# Patient Record
Sex: FEMALE | Race: WHITE
[De-identification: names, ages, dates, MRNs, and addresses within clinical notes are randomized per-mention and may not be internally consistent; named-entity substitution may affect disease eponyms.]

---

## 2017-08-06 ENCOUNTER — HOSPITAL ENCOUNTER (EMERGENCY)
Dept: HOSPITAL 25 - UCEAST | Age: 82
Discharge: HOME | End: 2017-08-06
Payer: SELF-PAY

## 2017-08-06 VITALS — DIASTOLIC BLOOD PRESSURE: 60 MMHG | SYSTOLIC BLOOD PRESSURE: 150 MMHG
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
## 2017-08-06 DIAGNOSIS — M25.571: Primary | ICD-10-CM
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
## 2017-08-06 DIAGNOSIS — I10: ICD-10-CM

## 2017-08-06 PROCEDURE — 99212 OFFICE O/P EST SF 10 MIN: CPT

## 2017-08-06 PROCEDURE — G0463 HOSPITAL OUTPT CLINIC VISIT: HCPCS
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
## 2018-01-28 ENCOUNTER — HOSPITAL ENCOUNTER (EMERGENCY)
Dept: HOSPITAL 25 - UCEAST | Age: 83
Discharge: HOME | End: 2018-01-28
Payer: MEDICARE

## 2018-01-28 VITALS — DIASTOLIC BLOOD PRESSURE: 49 MMHG | SYSTOLIC BLOOD PRESSURE: 118 MMHG

## 2018-01-28 DIAGNOSIS — I10: ICD-10-CM
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
## 2018-01-28 DIAGNOSIS — E05.90: ICD-10-CM
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
## 2018-01-28 DIAGNOSIS — S72.091A: Primary | ICD-10-CM

## 2018-01-28 DIAGNOSIS — M81.0: ICD-10-CM

## 2018-01-28 DIAGNOSIS — K21.9: ICD-10-CM

## 2018-01-28 DIAGNOSIS — E78.5: ICD-10-CM

## 2018-01-28 PROCEDURE — 99213 OFFICE O/P EST LOW 20 MIN: CPT

## 2018-01-28 PROCEDURE — G0463 HOSPITAL OUTPT CLINIC VISIT: HCPCS

## 2018-01-28 NOTE — RAD
INDICATION: Traumatic fracture right elbow     



COMPARISON: None



TECHNIQUE: AP, lateral, and oblique views were obtained.



FINDINGS: There is a distracted and rotated fracture from the olecranon. No other

definitive fractures are evident. There is underlying osteoarthritic. Change. There is

prominent soft tissue swelling about the proximal ulnar fracture site.



IMPRESSION: PROXIMAL ULNAR FRACTURE

## 2018-01-28 NOTE — RAD
INDICATION: Right hip pain     



COMPARISON: Lumbar spine 2008

 

TECHNIQUE: An AP view of the pelvis and AP views of the hip in neutral and abducted

position were obtained 



FINDINGS: 



Bones: There are no acute bony findings. 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

hemipelvis, unchanged.



Joint spaces: There is moderate osteoarthritis about the right hip.



SI joints/symphysis: The SI joints and symphysis are intact.



Other: None



IMPRESSION: OSTEOARTHRITIS RIGHT HIP. 

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
## 2018-01-28 NOTE — RAD
INDICATION: Traumatic fracture right shoulder. Hypercalcemia     



COMPARISON: None



TECHNIQUE: AP, lateral, and oblique views were obtained.



FINDINGS: There is osteopenia with an acute fracture surgical neck of the humerus and

humeral head. The fracture fragments are displaced rotated laterally. There are multiple

old right-sided rib fractures. There are possible areas of focal lytic change in the

clavicle or perhaps the scapula. There are some limitations due to osteopenia and

position. Consider follow-up imaging to include CT imaging imaging when the patient's

clinical condition permits.



IMPRESSION: ACUTE PROXIMAL HUMERAL FRACTURE. FOLLOW-UP IMAGING MAY BE REQUIRED (SEE

ABOVE).

## 2018-08-25 ENCOUNTER — HOSPITAL ENCOUNTER (EMERGENCY)
Dept: HOSPITAL 25 - UCEAST | Age: 83
Discharge: HOME | End: 2018-08-25
Payer: MEDICARE

## 2018-08-25 VITALS — SYSTOLIC BLOOD PRESSURE: 150 MMHG | DIASTOLIC BLOOD PRESSURE: 60 MMHG
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
## 2018-08-25 DIAGNOSIS — I10: ICD-10-CM

## 2018-08-25 DIAGNOSIS — E05.90: ICD-10-CM
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
## 2018-08-25 DIAGNOSIS — S40.011A: Primary | ICD-10-CM

## 2018-08-25 PROCEDURE — G0463 HOSPITAL OUTPT CLINIC VISIT: HCPCS

## 2018-08-25 PROCEDURE — 99211 OFF/OP EST MAY X REQ PHY/QHP: CPT

## 2018-08-25 NOTE — ED
Upper Extremity Pain





- HPI Summary


HPI Summary: 








IN-ROOM NOTE: 





Patient is a 83 y/o F w/ c/o RIGHT SHOULDER DISCOMFORT onsetting after a FALL 

WHILE GETTING UP FROM A CHAIR four days ago. Patient directly struck shoulder 

on uncarpeted floor. No spinal injury, head injury, LOC. Shoulder little 

initial pain after incident. Two days ago, pain worsened. There was BRUISING on 

LOWER BACK. Pain in room is still present. According to daughter, while 

cleaning frying pan with circular arm motion, she experiencing right shoulder 

pain which radiated down to back. Daughter was concerned she broke her right 

shoulder. Patient states she feels fine except for inability to raise right 

arm. Daughter states patient states she experiences sharp pain while raising 

right shoulder. When she broke right shoulder and elbow this past January, she 

had surgery at that point. Right shoulder has been broken previously. Patient 

lives with daughter. FMHx of HTN, no cardiac disease on her side. Patient is 

retired. PMHx of afib is denied. Patient takes medication for hypertension and 

bone issues. 





MD NOTE:


This is an 84-year-old female who has osteoporosis complaining of right 

shoulder discomfort after a fall 4 days ago.  She was sitting at a table and 

when she went to get up she fell between 2 chairs.  She denies other injury, 

loss of consciousness, neck pain or acute confusion.





Her vital signs are stable, she is afebrile.  Physical history reveals a 

fracture to the right shoulder in January, 2018.





Her previous medical history is significant for hypertension and 

hyperthyroidism.  She also has vitamin D deficiency.  She is brought to the 

[facility] by her daughter.





She complains of moderate pain over the anterior lateral aspect of the right 

shoulder, and her systolic pressure is 150.





NURSE'S NOTE:


Pt c/o pain in R shoulder.  Pt states fell from dining room table between two 

chairs on Tuesday 8/21/18.  Denies hitting head- denies LOC.  Pt states pain is 

same from Tuesday. Pain is 6/10 on note.  





- History of Current Complaint


Chief Complaint: UCUpperExtremity


Stated Complaint: R SHOULDER INJURY


Time Seen by Provider: 08/25/18 07:52


Hx Obtained From: Patient, Family/Caretaker - daughter contributes to HPI


Hx Last Menstrual Period: N/A


Mechanism Of Injury: Other - patient was getting up from a chair, fell and hit 

her right shoulder on uncarpeted floor


Onset/Duration: Started Days Ago - injury four days ago, Still Present, Worse 

Since - two days ago


Timing: Constant


Severity Currently: Moderate - 6/10


Character: Sharp


Aggravating Factor(s): Other - circular motions while cleaning pan aggravate 

pain


Alleviating Factor(s): Nothing


Associated Signs & Symptoms: Positive: Bruising - LOWER BACK, Other - NEGATIVE: 

HEAD INJURY, LOC, SPINAL INJURY





- Allergies/Home Medications


Allergies/Adverse Reactions: 


 Allergies











Allergy/AdvReac Type Severity Reaction Status Date / Time


 


No Known Allergies Allergy   Verified 08/25/18 07:40














PMH/Surg Hx/FS Hx/Imm Hx


Previously Healthy: Yes


Endocrine/Hematology History: Reports: Hx Thyroid Disease - HYPERTHYROIDISM


   Denies: Hx Diabetes


Cardiovascular History: Reports: Hx Hypertension


   Denies: Hx Congestive Heart Failure, Hx Deep Vein Thrombosis, Hx Myocardial 

Infarction, Hx Pacemaker/ICD


Respiratory History: 


   Denies: Hx Asthma, Hx Chronic Obstructive Pulmonary Disease (COPD), Hx Lung 

Cancer, Hx Pneumonia, Hx Pulmonary Embolism


GI History: 


   Denies: Hx Gall Bladder Disease, Hx Gastrointestinal Bleed, Hx Ulcer, Hx 

Urosepsis


   Comment Only: Other GI Disorders - REFLUX


 History: 


   Denies: Hx Kidney Stones, Hx Renal Disease


Neurological History: 


   Denies: Hx Dementia, Hx Migraine, Hx Seizures, Hx Transient Ischemic Attacks 

(TIA)


Psychiatric History: Reports: Hx Depression - Dysthymia.


   Denies: Hx Anxiety, Hx Schizophrenia, Hx Bipolar Disorder





- Surgical History


Surgery Procedure, Year, and Place: R shoulder and elbow Jan 2018.  pelvic bone


Infectious Disease History: No


Infectious Disease History: Reports: Hx Shingles


   Denies: Hx Clostridium Difficile, Hx Hepatitis, Hx Human Immunodeficiency 

Virus (HIV), Hx of Known/Suspected MRSA, Hx Tuberculosis, Hx Known/Suspected VRE

, Hx Known/Suspected VRSA, History Other Infectious Disease, Traveled Outside 

the [place] in Last 30 Days





- Family History


Known Family History: Positive: Hypertension, Other - Osteoporosis, father had 

strokes


   Negative: None





- Social History


Alcohol Use: None


Substance Use Type: Reports: None


Smoking Status (MU): Never Smoked Tobacco





Review of Systems


Positive: Other - POSITIVE: RIGHT SHOULDER PAIN, LOWER BACK PAIN; NEGATIVE: 

SPINAL INJURY 


Positive: Bruising - LOWER BACK 


Neurological: Other - NEGATIVE: HEAD INJURY 


Negative: Syncope


All Other Systems Reviewed And Are Negative: Yes





- Comments


Additional Review of Systems Comments: 


POSITIVE: RIGHT SHOULDER PAIN, LOWER BACK PAIN, LOWER BACK BRUISING. 


NEGATIVE: LOC, HEAD INJURY, SPINAL INJURY








Physical Exam





- Summary


Physical Exam Summary: 


Appearance: The patient is well-appearing, is in no pain distress, and is well-

nourished.


Eyes: Conjunctiva are clear.


ENT: The hearing is grossly normal, the pharynx is normal, and the TMs are 

normal. There is no muffled or hoarse voice.


Neck: The neck is supple and there is no lymphadenopathy.


Respiratory: The chest is nontender. LUNGS CLEAR, there are normal breath sounds

, and there is no respiratory distress.


Cardiovascular: HEART REGULAR RATE AND RHYTHM. There is no murmur.


Abdomen: The abdomen is soft and nontender. There is no organomegaly.


Bowel sounds: present


Musculoskeletal: Strength is intact. The patient moves all extremities.


Neurological: The patient is alert.


Psychological: The patient displays age appropriate behavior


Skin: Negative for rashes. 





EXAMINATION OF RIGHT SHOULDER: PATIENT IS SITTING COMFORTABLY, CAN REMOVE SHIRT 

WITHOUT DISTRESS. NO PAIN WITH PASSIVE MOVEMENT, FOWARD ACROSS CHEST, MID 

DISCOMFORT WITH TWISTING AND PUTTING ARM BEHIND BACK; NO CREPITUS OVER 

POSTERIOR THORAX; MILD TENDERNESS OVER ANTERIOR SHOULDER JUST MEDIAL TO THE 

HUMERAL HEAD. NO EVIDENT FRACTURE OR DISLOCATION. GOOD MOVEMENT AND CIRCULATION 

OVER ELBOW, WRIST, AND HAND ON RIGHT.





Triage Information Reviewed: Yes


Vital Signs On Initial Exam: 


 Initial Vitals











Temp Pulse Resp BP Pulse Ox


 


 97.6 F   67   18   150/60   99 


 


 08/25/18 07:43  08/25/18 07:43  08/25/18 07:43  08/25/18 07:43  08/25/18 07:43











Vital Signs Reviewed: Yes





Diagnostics





- Vital Signs


 Vital Signs











  Temp Pulse Resp BP Pulse Ox


 


 08/25/18 07:43  97.6 F  67  18  150/60  99














- Laboratory


Lab Statement: Any lab studies that have been ordered have been reviewed, and 

results considered in the medical decision making process.





- Radiology


  ** RIGHT SHOULDER X-RAY


Xray Interpretation: No Acute Changes


Radiology Interpretation Completed By: Radiologist - REPORT AND IMPRESSION:  #.

   Healed impacted surgical neck fracture of the humerus.  #.  No acute 

fracture evident. Normal acromioclavicular and glenohumeral joint alignment.  #

.  Significant glenohumeral joint osteophytosis without significant joint space 

narrowing.  #.  Unremarkable soft tissue contours.  #.  Chronic healed 

fractures at multiple RIGHT ribs noted. RIGHT supraclavicular fossa level 

surgical clips. [facility] physician has reviewed this report and agrees.





Re-Evaluation





- Re-Evaluation


  ** First Eval


Re-Evaluation Time: 08:52


Comment: Discussed x-ray results and informed patient of plan to discharge. 

Patient is agreeable with plan.





Course/Dx





- Course


Course Of Treatment: 83 y/o w/ osteoporosis who reinjuried her right shoulder. 

Previous hx of comminuted fracture. Exam is unremarkable and consistent with X-

ray reading that there is no acute fracture. The differential is fracture vs 

contusion, Dx is contusion of right shoulder and soft tissue injury.





- Diagnoses


Differential Diagnosis/HQI/PQRI: Positive: Other - right shoulder fracture vs 

right shoulder contusion


Provider Diagnoses: 


 Contusion








Discharge





- Sign-Out/Discharge


Documenting (check all that apply): Patient Departure - discharge 


All imaging exams completed and their final reports reviewed: Yes





- Discharge Plan


Condition: Stable


Disposition: HOME


Patient Education Materials:  Contusion in Adults (ED)


Referrals: 


Theodore Sawyer MD [Primary Care Provider] - 


Additional Instructions: 


PLEASE SEEK CARE AT THE EMERGENCY DEPARTMENT IF SYMPTOMS WORSEN OR IF NEW 

SYMPTOMS DEVELOP. FOLLOW UP WITH YOUR PRIMARY CARE PHYSICIAN.





AS we discussed:





You have bruised her right shoulder.  There is no evidence of broken bones.





Warm moist heat to the area in the morning; ice during the day for any sharp 

pains.





Keep the shoulder loose by gentle swinging exercises.





Follow-up or call us at any time for increased pain or disability.

















- Billing Disposition and Condition


Condition: STABLE


Disposition: Home





- Attestation Statements


Document Initiated by Bao: Yes


Documenting Scribe: Rodrigue Bojorquez


Provider For Whom Bao is Documenting (Include Credential): Casey Flores M.D. 


Scribe Attestation: 


Rodrigue RAHMAN scribed for Casey Flores M.D.  on 08/25/18 at 0900. 


Scribe Documentation Reviewed: Yes


Provider Attestation: 


The documentation as recorded by the Rodrigue barraza accurately reflects 

the service I personally performed and the decisions made by me, Casey Flores M.D.

## 2018-08-25 NOTE — RAD
Indication: RIGHT shoulder/scapular region pain on abduction and post fall. Previous

surgery in February 2018.



Comparison: January 28, 2018



Technique: Internal rotation AP, external rotation Grashey, scapular Y, axillary views

RIGHT shoulder



REPORT AND IMPRESSION: 

#.   Healed impacted surgical neck fracture of the humerus. 

#.  No acute fracture evident. Normal acromioclavicular and glenohumeral joint alignment. 

#.  Significant glenohumeral joint osteophytosis without significant joint space

narrowing. 

#.  Unremarkable soft tissue contours. 

#.  Chronic healed fractures at multiple RIGHT ribs noted. RIGHT supraclavicular fossa

level surgical clips.

## 2019-07-17 ENCOUNTER — HOSPITAL ENCOUNTER (EMERGENCY)
Dept: HOSPITAL 25 - UCEAST | Age: 84
Discharge: HOME | End: 2019-07-17
Payer: MEDICARE

## 2019-07-17 VITALS — SYSTOLIC BLOOD PRESSURE: 138 MMHG | DIASTOLIC BLOOD PRESSURE: 70 MMHG
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
## 2019-07-17 DIAGNOSIS — S20.212A: ICD-10-CM

## 2019-07-17 DIAGNOSIS — S05.32XA: ICD-10-CM

## 2019-07-17 DIAGNOSIS — I10: ICD-10-CM

## 2019-07-17 DIAGNOSIS — S00.12XA: Primary | ICD-10-CM

## 2019-07-17 PROCEDURE — 90715 TDAP VACCINE 7 YRS/> IM: CPT

## 2019-07-17 PROCEDURE — 99213 OFFICE O/P EST LOW 20 MIN: CPT

## 2019-07-17 PROCEDURE — G0463 HOSPITAL OUTPT CLINIC VISIT: HCPCS

## 2019-07-17 PROCEDURE — 70450 CT HEAD/BRAIN W/O DYE: CPT

## 2019-07-17 PROCEDURE — 70480 CT ORBIT/EAR/FOSSA W/O DYE: CPT

## 2019-07-17 PROCEDURE — 90471 IMMUNIZATION ADMIN: CPT

## 2019-07-17 NOTE — UC
General HPI





- HPI Summary


HPI Summary: 





Pt presents to  with daughter. Pt lives in assisted living. Pt was walking 

outside yesterday, stubbed toe on uneven sidewalk - fells - causing bruising to 

left eye, laceration to left lateral orbit. No LOC  Pt also with pain left rib.

  Pt able to get up on her own Today daughter went to visit and saw the bruised 

eye. Pt denies cp, sob, abd pain.  no n/v/d.  no hematuria. + po.  Pt took APAP 

last night  no analgesia today  no anticoagulation  unknown last tdap


medications reviewed 








- History of Current Complaint


Chief Complaint: UCLaceration


Stated Complaint: FACIAL INJURY


Time Seen by Provider: 19 18:38


Hx Obtained From: Patient


Hx Last Menstrual Period: N/A


Onset Severity: Moderate


Current Severity: Moderate


Pain Intensity: 4





- Allergy/Home Medications


Allergies/Adverse Reactions: 


 Allergies











Allergy/AdvReac Type Severity Reaction Status Date / Time


 


No Known Allergies Allergy   Verified 19 17:40











Home Medications: 


 Home Medications





Fluoxetine HCl [Prozac] 40 mg PO 19 [History]











PMH/Surg Hx/FS Hx/Imm Hx


Previously Healthy: Yes


Cardiovascular History: Hypertension


Other History Of: 


   Negative For: HIV, Hepatitis B, Hepatitis C





- Surgical History


Surgical History: Yes


Surgery Procedure, Year, and Place: R shoulder and elbow 2018.  pelvic bone





- Family History


Known Family History: Positive: Hypertension, Other - Osteoporosis, father had 

strokes, Non-Contributory


   Negative: None





- Social History


Occupation: Retired


Lives: Assisted Living


Alcohol Use: None


Substance Use Type: None


Smoking Status (MU): Never Smoked Tobacco





- Immunization History


Most Recent Influenza Vaccination: 


Most Recent Pneumonia Vaccination: last couple years





Review of Systems


All Other Systems Reviewed And Are Negative: Yes


Constitutional: Positive: Negative


Skin: Positive: Bruising, Other - laceration left lateral orbit


Respiratory: Positive: Other - left anterior ribs


Is Patient Immunocompromised?: No





Physical Exam





- Summary


Physical Exam Summary: 





Vital Signs Reviewed: Yes


A+Ox3, no distress


Eyes: Conjunctiva Clear, WILBERT. EOM intact and full, no photophobia Pt with 1cm 

laceration left lateral margin of orbit. Pt with ecchymosis left orbit. no 

crepitus 


ENT: Hearing grossly normal  TM x 2 clear - no hemotymp, no septal hematoma, 

mmoist, uvula midline, no exudate, no erythema  


Neck: Positive: Supple


Respiratory: Positive: No respiratory distress, No accessory muscle use + CTA 

throughout  no w/r no splinting  + TTP left lateral anterior inferior ribs  no 

ecchymosis breast, chest wall


Cardiovascular: RRR  nl s1, s2  no m/r  CBT <2  sec


abd soft + BS nt/nd  no guarding, no distension, no bruising


Musculoskeletal Exam: DALY x 4 without difficulty Strength Intact, ROM Intact  

no neck pain - FAROM - no spinous prociess pain c/t/l/s


Neurological: Positive: Alert,  + sensation throughout


Psychological: Positive: Normal Response To evaluator


Skin: Positive: no rash, no ecchymosis,  abraisons knees, 


Triage Information Reviewed: Yes


Vital Signs: 


 Initial Vital Signs











Temp  98.9 F   19 17:33


 


Pulse  109   19 17:33


 


Resp  20   19 17:33


 


BP  138/70   19 17:33


 


Pulse Ox  95   19 17:33














Diagnostics





- Radiology


  ** No standard instances


Radiology Interpretation Completed By: ED Physician - ribs - no fx, no ptx, 

Radiologist - Patient Name:                    GLORIA COSBY               

                                                                               

           Medical Record#: [id number] Ordering Physician: Danita Sears MD    

                                                                               

                                    Acct.#: [id number] :         1933                    Age: 85   Sex: F                                       

                                                         Location: [facility] Exam Date: 19                                         

                                                                               

           ADM Status: REG ER Order Information:                               

                CT BRAIN WO Accession Number:                                  

              [id number] CPT:                                                 

            23590 EXAM:   CT Head Without Contrast   EXAM DATE/TIME:   2019 7:12 PM   CLINICAL HISTORY:   85 years old, female; Pain and injury or 

trauma; Initial encounter; Blunt  trauma (contusions or hematomas); Without 

loss of consciousness; Injury  details: Left eye pain and bruising after fall 

today on to face no head  complaints and denies loc. ; Additional info: Fall, 

left facial contusion S/P  fall   TECHNIQUE:   Imaging protocol: Axial computed 

tomography images of the head without  contrast.   Radiation optimization: All 

CT scans at this facility use at least one of  these dose optimization 

techniques: automated exposure control; mA and/or kV  adjustment per patient 

size (includes targeted exams where dose is matched to  clinical indication); 

or iterative reconstruction.   COMPARISON:   No relevant prior studies 

available.   FINDINGS:   Brain: There are moderate periventricular and 

subcortical lucencies consistent  with chronic microvascular ischemic changes. 

The gray-white differentiation is  maintained. No hemorrhage. No edema.   

Ventricles: Ventricles and sulci are prominent consistent with age appropriate  

parenchymal volume loss.   Bones/joints: Unremarkable. No acute fracture.   

Sinuses: Visualized sinuses are unremarkable. No fluid levels.   Mastoid air 

cells: Visualized mastoid air cells are well aerated. No mastoid  effusion.   

Soft tissues: Unremarkable.   IMPRESSION:  No acute intracranial abnormality. 

Chronic microvascular ischemic changes.  To contact [facility] with a general question

: Operations Center - [phone] For direct physician to physician contact: 

Physician Hotline - [phone] [facility] ([facility] Facility 

ID #[id number])  ____________________________________________________________ <

Electronically signed by Bayron Hamilton MD in OV>  19 2016 Dictated By: 

Bayron Hamilton MD  This report is only to be considered final once signed by the 

Provider(s) as displayed in the "<Electronically Signed by >" field (s). 

Absence of a  signature indicates the report is in a draft status and still 

needs to be finalized. In the event this document was created by someone other 

than the  signing Provider, the individual initiating the document will be 

listed in the "Entered by:" or "Dictated by:" fields.                          

                                                                         1 of 2

  Patient Name:         GLORIA COSBY                                      

                          Medical Record#: [id number] Ordering Physician: Danita Sears MD                                                                   

Acct.#: [id number] :     1933         Age: 85   Sex: F              

                                             Location: [facility] Exam Date: 19 185                                                

                               ADM Status: REG ER Order Information:           

              CT ORBIT W/O Accession Number:                          

[id number] CPT:                                       08231 EXAM:   CT Orbits 

Without Contrast   EXAM DATE/TIME:   2019 7:14 PM   CLINICAL HISTORY:   85 

years old, female; Patient HX: Left eye pain and bruising after fall today  on 

to face no head complaints and denies loc. ; Additional info: Fall, left  

facial contusion S/P fall   TECHNIQUE:   Imaging protocol: Axial computed 

tomography images of the orbits without  intravenous contrast. Coronal and 

sagittal reformatted images were created and  reviewed.   Radiation optimization

: All CT scans at this facility use at least one of  these dose optimization 

techniques: automated exposure control; mA and/or kV  adjustment per patient 

size (includes targeted exams where dose is matched to  clinical indication); 

or iterative reconstruction.   COMPARISON:   No relevant prior studies 

available.   FINDINGS:   Orbits: Bilateral cataract surgery. The globes appear 

intact.   Sinuses: Normal. No air-fluid levels.   Bones/joints: No acute 

fracture.   Soft tissues: Mild left periorbital edema.   IMPRESSION:  No acute 

abnormality.  To contact [facility] with a general question: Operations Center - [phone] For direct physician to physician contact: Physician Hotline - [phone] [facility] ([facility] Facility ID #[id number])  __________________

__________________________________________ <Electronically signed by Bayron Hamilton MD in OV>  19 Dictated By: Bayron Hamilton MD Dictated Date/Time: 2032 Transcribed Date/Time:   Copy to:      This report is only to be 

considered final once signed by the Provider(s) as displayed in the "<

Electronically Signed by >" field (s). Absence of a  signature indicates the 

report is in a draft status and still needs to be finalized. In the event this 

document was created by someone other than the  signing Provider, the 

individual initiating the document will be listed in the "Entered by:" or 

"Dictated by:" fields.                                                         

         1 of 2





Course/Dx





- Course


Course Of Treatment: 





pt with mechanical fall yesterday.  laceration with scab to left lateral orbit, 

left ecchymosis, left anterior rib pain - no LOC


no anticoagulation - unknown tetanus


VSS


Will image CT head, orbit, cxr/rib


APAP


ice


wound - clean and steristrip by me


tetanus booster


reassess








- Diagnoses


Provider Diagnosis: 


 Laceration, Facial contusion, Contusion of rib on left side, Abrasion, Need 

for tetanus booster








Discharge





- Sign-Out/Discharge


Documenting (check all that apply): Patient Departure


All imaging exams completed and their final reports reviewed: No





- Discharge Plan


Condition: Stable


Disposition: HOME


Patient Education Materials:  Diphtheria/Acellular Pertussis/Tetanus Vaccine (

By injection), Laceration (ED), Rib Contusion (ED), Facial Contusion (ED)


Referrals: 


Theodore Sawyer MD [Primary Care Provider] - 


Additional Instructions: 


- Okay to take Tylenol every 6 hours for pain


- okay to apply ice or heat to your ribs for pain


- take deep, slow breaths several times an hour - hold a blanket or pillow 

against your ribs for support


- you received a tetanus vaccination booster today - your arm will likely be 

sore over the next 1-2 days - this is latanya


- contact your doctor tomorrow to schedule a follow-up appointment - recheck 

next week


- monitor your facial wound for infection - reddness, red streaking, drainage, 

odor - if you have any concerns contact your doctor





As discussed, your radiograph was reviewed by the provider that treated you 

tonight. It will be read by a radiologist tomorrow morning. If there is a 

finding other than that discussed with you today, you will receive a call from 

a care provider. As discussed, the provider will contact your Lucinda lewis, 

with any new or different findings








- Billing Disposition and Condition


Condition: STABLE


Disposition: Home

## 2019-07-17 NOTE — UC
Minor Trauma HPI





- History of Current Complaint


Chief Complaint: UCLaceration


Stated Complaint: FACIAL INJURY


Time Seen by Provider: 07/17/19 18:38


Hx Last Menstrual Period: N/A


Pain Intensity: 4





- Allergies/Home Medications


Allergies/Adverse Reactions: 


 Allergies











Allergy/AdvReac Type Severity Reaction Status Date / Time


 


No Known Allergies Allergy   Verified 07/17/19 17:40











Home Medications: 


 Home Medications





Fluoxetine HCl [Prozac] 40 mg PO 07/17/19 [History]











PMH/Surg Hx/FS Hx/Imm Hx


Other History Of: 


   Negative For: HIV, Hepatitis B, Hepatitis C





- Surgical History


Surgical History: Yes


Surgery Procedure, Year, and Place: R shoulder and elbow Jan 2018.  pelvic bone





- Family History


Known Family History: Positive: Hypertension, Other - Osteoporosis, father had 

strokes


   Negative: None





- Social History


Alcohol Use: None


Substance Use Type: None


Smoking Status (MU): Never Smoked Tobacco





- Immunization History


Most Recent Influenza Vaccination: 2016


Most Recent Pneumonia Vaccination: last couple years





Physical Exam


Vital Signs: 


 Initial Vital Signs











Temp  98.9 F   07/17/19 17:33


 


Pulse  109   07/17/19 17:33


 


Resp  20   07/17/19 17:33


 


BP  138/70   07/17/19 17:33


 


Pulse Ox  95   07/17/19 17:33














Discharge





- Discharge Plan


Referrals: 


Theodore Sawyer MD [Primary Care Provider] -

## 2019-07-18 NOTE — UC
- Progress Note


Progress Note: 





Patient Name:         GLORIA COSBY                                        

                        Medical Record#: [id number]


Ordering Physician: Danita Sears MD                                           

                        Acct.#: [id number]


:     1933         Age: 85   Sex: F                                   

                        Location: URGENT [facility]


Exam Date: 19                                                       

                        ADM Status: DEP ER


Order Information:                         RIBS LT UNI W/PA CH MIN 3 VWS


Accession Number:                          [id number]


CPT:                                       83473


INDICATION:  Left rib injury.





COMPARISON:  There are no relevant prior studies available for comparison.





TECHNIQUE:  5 views of the left ribs and dual-energy PA views of the chest were 

obtained.





FINDINGS:  There appear to be old fractures of the right posterior fourth 

through eighth


ribs. In addition there appear to be old fractures of the left fourth through 

sixth ribs.


No acute fracture is seen.





The heart is within normal limits in size. The lungs are hyperinflated. There 

are linear


densities at the left lung base most consistent with atelectasis. No 

pneumothorax or


pleural effusion is seen.





IMPRESSION:  MULTIPLE BILATERAL OLD RIB FRACTURES, NO ACUTE FRACTURE IS SEEN.








____________________________________________________________


<Electronically signed by Daniel Barnes MD in OV>  19


Dictated By: Daniel Barnes MD


Dictated Date/Time: 19


Transcribed Date/Time: 19


Copy to:











CC:Theodore Sawyer MD; Danita Sears MD


Imaging - [facility]                                 Imaging - [facility] 

[facility]                                     Imaging - [facility] 


[address]                                       [address] [address]


ph ([phone])                                     ph ([phone])        

                                        ph ([phone]) 






































This report is only to be considered final once signed by the Provider(s) as 

displayed in the "<Electronically Signed by >" field (s). Absence of a 


signature indicates the report is in a draft status and still needs to be 

finalized. In the event this document was created by someone other than the 


signing Provider, the individual initiating the document will be listed in the 

"Entered by:" or "Dictated by:" fields.


                                                                 1 of 1








Course/Dx





- Diagnoses


Provider Diagnoses: 


 Laceration, Facial contusion, Contusion of rib on left side, Abrasion, Need 

for tetanus booster








Discharge





- Sign-Out/Discharge


Documenting (check all that apply): Patient Departure


All imaging exams completed and their final reports reviewed: Yes





- Discharge Plan


Condition: Stable


Disposition: HOME


Patient Education Materials:  Diphtheria/Acellular Pertussis/Tetanus Vaccine (

By injection), Laceration (ED), Rib Contusion (ED), Facial Contusion (ED)


Referrals: 


Theodore Sawyer MD [Primary Care Provider] - 


Additional Instructions: 


- Okay to take Tylenol every 6 hours for pain


- okay to apply ice or heat to your ribs for pain


- take deep, slow breaths several times an hour - hold a blanket or pillow 

against your ribs for support


- you received a tetanus vaccination booster today - your arm will likely be 

sore over the next 1-2 days - this is latanya


- contact your doctor tomorrow to schedule a follow-up appointment - recheck 

next week


- monitor your facial wound for infection - reddness, red streaking, drainage, 

odor - if you have any concerns contact your doctor





As discussed, your radiograph was reviewed by the provider that treated you 

tonight. It will be read by a radiologist tomorrow morning. If there is a 

finding other than that discussed with you today, you will receive a call from 

a care provider. As discussed, the provider will contact your Lucinda elwis, 

with any new or different findings








- Billing Disposition and Condition


Condition: STABLE


Disposition: Home